# Patient Record
Sex: FEMALE | Race: BLACK OR AFRICAN AMERICAN | Employment: FULL TIME | URBAN - METROPOLITAN AREA
[De-identification: names, ages, dates, MRNs, and addresses within clinical notes are randomized per-mention and may not be internally consistent; named-entity substitution may affect disease eponyms.]

---

## 2020-03-19 LAB — MAMMOGRAPHY, EXTERNAL: NORMAL

## 2020-05-05 NOTE — PATIENT DISCUSSION
Due to high risk AMD, would recommend FA/FP today. R & B discussed in detail. Pt elects to proceed. FA/FP completed without complication. FA consistent with exam. No active CNVM seen on exam or testing. Higher risk AMD, needs to be monitored closely. Recommend continuing with AREDS 2 and Amsler Grid daily. Pt leaving to go [facility], information given to patient on [address] if patient notices any vision changes while away. Will follow up in 6 months.

## 2020-10-26 PROBLEM — Z80.0 FAMILY HISTORY OF COLON CANCER: Status: ACTIVE | Noted: 2020-10-26

## 2020-10-26 PROBLEM — M81.0 AGE-RELATED OSTEOPOROSIS WITHOUT CURRENT PATHOLOGICAL FRACTURE: Status: ACTIVE | Noted: 2020-10-26

## 2020-10-26 PROBLEM — G47.33 OBSTRUCTIVE SLEEP APNEA SYNDROME: Status: ACTIVE | Noted: 2018-06-21

## 2020-10-28 ENCOUNTER — APPOINTMENT (OUTPATIENT)
Dept: FAMILY MEDICINE CLINIC | Age: 65
End: 2020-10-28

## 2020-10-28 ENCOUNTER — CLINICAL SUPPORT (OUTPATIENT)
Dept: FAMILY MEDICINE CLINIC | Age: 65
End: 2020-10-28

## 2020-10-28 VITALS
BODY MASS INDEX: 43.83 KG/M2 | SYSTOLIC BLOOD PRESSURE: 132 MMHG | HEART RATE: 67 BPM | DIASTOLIC BLOOD PRESSURE: 65 MMHG | TEMPERATURE: 97.5 F | OXYGEN SATURATION: 100 % | RESPIRATION RATE: 16 BRPM | HEIGHT: 59 IN

## 2020-10-28 DIAGNOSIS — I10 ESSENTIAL HYPERTENSION: Primary | ICD-10-CM

## 2020-10-28 NOTE — PROGRESS NOTES
Chief Complaint   Patient presents with    Blood Pressure Check     Pt took her medications this morning       Pt advised to make 3 mo follow up and if NP Fredy Ch wants to make any changes will call at [phone] .

## 2020-10-29 LAB
25(OH)D3 SERPL-MCNC: 29.3 NG/ML (ref 32–100)
ABSOLUTE LYMPHOCYTE COUNT, 10803: 3.7 K/UL (ref 1–4.8)
AVG GLU, 10930: 123 MG/DL (ref 91–123)
BASOPHILS # BLD: 0.1 K/UL (ref 0–0.2)
BASOPHILS NFR BLD: 1 % (ref 0–2)
CHOLEST SERPL-MCNC: 181 MG/DL (ref 110–200)
CREATININE, URINE: 68 MG/DL
EOSINOPHIL # BLD: 0.1 K/UL (ref 0–0.5)
EOSINOPHIL NFR BLD: 1 % (ref 0–6)
ERYTHROCYTE [DISTWIDTH] IN BLOOD BY AUTOMATED COUNT: 16.7 % (ref 10–15.5)
GRANULOCYTES,GRANS: 50 % (ref 40–75)
HBA1C MFR BLD HPLC: 5.9 % (ref 4.8–5.6)
HCT VFR BLD AUTO: 47.9 % (ref 35.1–48.3)
HCV AB SER IA-ACNC: NORMAL
HDLC SERPL-MCNC: 3.4 MG/DL (ref 0–5)
HDLC SERPL-MCNC: 54 MG/DL
HGB BLD-MCNC: 13.9 G/DL (ref 11.7–16.1)
LDL/HDL RATIO,LDHD: 2
LDLC SERPL CALC-MCNC: 106 MG/DL (ref 50–99)
LYMPHOCYTES, LYMLT: 39 % (ref 20–45)
MCH RBC QN AUTO: 26 PG (ref 26–34)
MCHC RBC AUTO-ENTMCNC: 29 G/DL (ref 31–36)
MCV RBC AUTO: 88 FL (ref 81–99)
MICROALB/CREAT RATIO, 140286: NORMAL
MICROALBUMIN,URINE RANDOM 140054: <12 MG/L (ref 0.1–17)
MONOCYTES # BLD: 1 K/UL (ref 0.1–1)
MONOCYTES NFR BLD: 11 % (ref 3–12)
NEUTROPHILS # BLD AUTO: 4.8 K/UL (ref 1.8–7.7)
NON-HDL CHOLESTEROL, 011976: 127 MG/DL
PLATELET # BLD AUTO: 395 K/UL (ref 140–440)
PMV BLD AUTO: 9.9 FL (ref 9–13)
RBC # BLD AUTO: 5.46 M/UL (ref 3.8–5.2)
TRIGL SERPL-MCNC: 108 MG/DL (ref 40–149)
VLDLC SERPL CALC-MCNC: 22 MG/DL (ref 8–30)
WBC # BLD AUTO: 9.7 K/UL (ref 4–11)

## 2021-01-29 ENCOUNTER — IMPORTED ENCOUNTER (OUTPATIENT)
Dept: URBAN - METROPOLITAN AREA CLINIC 1 | Facility: CLINIC | Age: 66
End: 2021-01-29

## 2021-01-29 PROBLEM — H25.813: Noted: 2021-01-29

## 2021-01-29 PROBLEM — R73.09: Noted: 2021-01-29

## 2021-01-29 PROCEDURE — 92015 DETERMINE REFRACTIVE STATE: CPT

## 2021-01-29 PROCEDURE — 92004 COMPRE OPH EXAM NEW PT 1/>: CPT

## 2021-01-29 NOTE — PATIENT DISCUSSION
1.  DM Type II (Diet Controlled) without sign of diabetic retinopathy and no blot heme on dilated retinal examination today OU No Macular Edema:  Discussed the pathophysiology of diabetes and its effect on the eye and risk of blindness. Stressed the importance of strong glucose control. Advised of importance of at least yearly dilated examinations but to contact us immediately for any problems or concerns. 2. Cataract OU --  Visually Significant secondary to glare OU discussed the risks benefits alternatives and limitations of cataract surgery. The patient stated a full understanding and a desire to proceed with the procedure. The patient will need to return for preop appointment with cataract measurements and to have any additional questions answered and start pre-operative eye drops as directed. Phaco PCL OS then ODOtherwise follow-up

## 2021-05-14 NOTE — PATIENT DISCUSSION
Explains patient new symptoms today of increased tearing.  rec d/c using a fan at night.  rec OTC Optcon-A and BRENDA BARNES.

## 2021-05-14 NOTE — PATIENT DISCUSSION
No active CNVM seen on exam or testing. Higher risk AMD, needs to be monitored closely. Recommend continuing with AREDS 2 and Amsler Grid daily. Pt leaving to go [facility], information given to patient on [address] if patient notices any vision changes while away. Will follow up in 12 months.

## 2021-06-11 ENCOUNTER — IMPORTED ENCOUNTER (OUTPATIENT)
Dept: URBAN - METROPOLITAN AREA CLINIC 1 | Facility: CLINIC | Age: 66
End: 2021-06-11

## 2021-06-11 PROBLEM — H25.812: Noted: 2021-06-11

## 2021-06-11 PROCEDURE — 92136 OPHTHALMIC BIOMETRY: CPT

## 2021-06-11 PROCEDURE — 92012 INTRM OPH EXAM EST PATIENT: CPT

## 2021-06-11 NOTE — PATIENT DISCUSSION
1. Cataract OS:  Visually Significant secondary to glare discussed the risks benefits alternatives and limitations of cataract surgery. The patient stated a full understanding and a desire to proceed with the procedure. Discussed with patient if PO Gtts are more than $120 for all three combined when filling at their Pharmacy please call our office to request generic substitutions. Phaco PCL OS  Lifestyle Questionnaire Completed. 2.  Longstanding Exotropia OU - ? Amblyopia ODReturn for an appointment for Return as scheduled with Dr. Norman Machuca.

## 2021-06-18 ENCOUNTER — IMPORTED ENCOUNTER (OUTPATIENT)
Dept: URBAN - METROPOLITAN AREA CLINIC 1 | Facility: CLINIC | Age: 66
End: 2021-06-18

## 2021-06-19 ENCOUNTER — IMPORTED ENCOUNTER (OUTPATIENT)
Dept: URBAN - METROPOLITAN AREA CLINIC 1 | Facility: CLINIC | Age: 66
End: 2021-06-19

## 2021-06-19 PROBLEM — Z96.1: Noted: 2021-06-19

## 2021-06-19 PROCEDURE — 99024 POSTOP FOLLOW-UP VISIT: CPT

## 2021-06-19 NOTE — PATIENT DISCUSSION
POD#1 CE/IOL Standard OS doing well. Use Tobradex ST BID OS and Prolensa QDaily OS: Use gtts through completion of PO gtt chart regimen/ Per our instructions given to patient.   Post op Warnings Reiterated RTC as scheduled

## 2021-06-21 ENCOUNTER — IMPORTED ENCOUNTER (OUTPATIENT)
Dept: URBAN - METROPOLITAN AREA CLINIC 1 | Facility: CLINIC | Age: 66
End: 2021-06-21

## 2021-06-21 PROBLEM — H25.811: Noted: 2021-06-21

## 2021-06-21 PROCEDURE — 92136 OPHTHALMIC BIOMETRY: CPT

## 2021-06-21 NOTE — PATIENT DISCUSSION
1.  Cataract OD: Visually Significant secondary to glare discussed the risks benefits alternatives and limitations of cataract surgery. The patient stated a full understanding and a desire to proceed with the procedure. Discussed with patient if PO Gtts are more than $120 for all three combined when filling at their Pharmacy please call our office to request generic substitutions. Pt understands they will need glasses post-op to achieve their best corrected vision. Phaco PCL OD 2. POW#3  CE/IOL Standard OS doing well. Cont Tobradex BID OS and Prolensa QDaily OS per gtt instruction sheet.  F/u as scheduled 2nd eye

## 2021-06-30 ENCOUNTER — IMPORTED ENCOUNTER (OUTPATIENT)
Dept: URBAN - METROPOLITAN AREA CLINIC 1 | Facility: CLINIC | Age: 66
End: 2021-06-30

## 2021-07-01 ENCOUNTER — IMPORTED ENCOUNTER (OUTPATIENT)
Dept: URBAN - METROPOLITAN AREA CLINIC 1 | Facility: CLINIC | Age: 66
End: 2021-07-01

## 2021-07-01 PROBLEM — Z96.1: Noted: 2021-07-01

## 2021-07-01 PROCEDURE — 99024 POSTOP FOLLOW-UP VISIT: CPT

## 2021-07-01 NOTE — PATIENT DISCUSSION
1. POD#1 Phaco/ PCL OD (Standard) - doing well. Use Tobradex ST BID OD Prolensa Qdaily OD: Use all three gtts through completion of PO gtt chart regimen/ Per our instructions given. Post op Warnings Reiterated 2. POW#3 Phaco/ PCL OS (Standard) - doing well  Use Tobradex ST BID OS and Prolensa Qdaily OS: Use gtts through completion of PO gtt regimen.  RTC as scheduled

## 2021-07-22 ENCOUNTER — IMPORTED ENCOUNTER (OUTPATIENT)
Dept: URBAN - METROPOLITAN AREA CLINIC 1 | Facility: CLINIC | Age: 66
End: 2021-07-22

## 2021-07-22 PROBLEM — Z96.1: Noted: 2021-07-22

## 2021-07-22 PROCEDURE — 99024 POSTOP FOLLOW-UP VISIT: CPT

## 2021-07-22 NOTE — PATIENT DISCUSSION
POM#1 CE/IOL OU (Standard OU) doing well. Use Tobradex ST BID OD & Prolensa Qdaily OD: Use through completion of po gtt regimen. MRX for glasses given. Return for an appointment in 6 months 27 with Dr. Ryan King.

## 2022-01-27 ENCOUNTER — IMPORTED ENCOUNTER (OUTPATIENT)
Dept: URBAN - METROPOLITAN AREA CLINIC 1 | Facility: CLINIC | Age: 67
End: 2022-01-27

## 2022-01-27 PROBLEM — R73.09: Noted: 2022-01-27

## 2022-01-27 PROBLEM — H04.123: Noted: 2022-01-27

## 2022-01-27 PROBLEM — H16.143: Noted: 2022-01-27

## 2022-01-27 PROBLEM — Z96.1: Noted: 2022-01-27

## 2022-01-27 PROCEDURE — 99214 OFFICE O/P EST MOD 30 MIN: CPT

## 2022-01-27 NOTE — PATIENT DISCUSSION
1.  DM Type II (Diet Controlled) without sign of diabetic retinopathy and no blot heme on dilated retinal examination today OU No Macular Edema:  Discussed the pathophysiology of diabetes and its effect on the eye and risk of blindness. Stressed the importance of strong glucose control. Advised of importance of at least yearly dilated examinations but to contact us immediately for any problems or concerns. 2. FREDA w/ PEK OU -- Recommend ATs TID OU routinely. 3.  Pseudophakia OU (Standard OU) -- Doing well. 4. Exotropia OU -- Longstanding. ? Amblyopia OD Patient deferred MRx at today's visit. Letter to PCP. return for an appointment in 1 year 27 with Dr. Maggi Mae.

## 2022-03-19 PROBLEM — M81.0 AGE-RELATED OSTEOPOROSIS WITHOUT CURRENT PATHOLOGICAL FRACTURE: Status: ACTIVE | Noted: 2020-10-26

## 2022-03-19 PROBLEM — G47.33 OBSTRUCTIVE SLEEP APNEA SYNDROME: Status: ACTIVE | Noted: 2018-06-21

## 2022-03-20 PROBLEM — Z80.0 FAMILY HISTORY OF COLON CANCER: Status: ACTIVE | Noted: 2020-10-26

## 2022-04-02 ASSESSMENT — TONOMETRY
OS_IOP_MMHG: 15
OS_IOP_MMHG: 14
OS_IOP_MMHG: 16
OS_IOP_MMHG: 17
OS_IOP_MMHG: 16
OS_IOP_MMHG: 16
OS_IOP_MMHG: 14
OD_IOP_MMHG: 15
OD_IOP_MMHG: 15
OD_IOP_MMHG: 17
OD_IOP_MMHG: 14
OD_IOP_MMHG: 16

## 2022-04-02 ASSESSMENT — VISUAL ACUITY
OD_CC: 20/40+1
OD_SC: 20/25
OS_SC: 20/25
OD_GLARE: 20/100
OS_GLARE: 20/100
OS_CC: 20/20
OS_PH: SC 20/25
OS_SC: J1
OD_CC: 20/30
OS_CC: 20/50
OS_CC: 20/25
OS_CC: 20/25
OS_CC: 20/50
OS_CC: 20/25
OD_GLARE: 20/100
OD_CC: 20/30-1
OS_CC: 20/30
OS_GLARE: 20/100
OD_SC: J2
OD_CC: 20/30
OD_CC: 20/25

## 2022-12-21 NOTE — PATIENT DISCUSSION
Recommend Mini lower lift, + platysmaplasty, post-tragel, SMAS to cheeks/chin/pre-jowl (discussed risks and benefits of sx. ..). Discussed no nicotine. Previous smoker.

## 2022-12-21 NOTE — PATIENT DISCUSSION
No active CNVM seen on exam or testing. Higher risk AMD, needs to be monitored closely. Recommend continuing with AREDS 2 and Amsler Grid daily. Pt leaving to go [facility], information given to patient on [address] if patient notices any vision changes while away. Will follow up in 12 months.